# Patient Record
Sex: MALE | Race: OTHER | NOT HISPANIC OR LATINO | URBAN - METROPOLITAN AREA
[De-identification: names, ages, dates, MRNs, and addresses within clinical notes are randomized per-mention and may not be internally consistent; named-entity substitution may affect disease eponyms.]

---

## 2019-11-25 ENCOUNTER — INPATIENT (INPATIENT)
Facility: HOSPITAL | Age: 19
LOS: 1 days | Discharge: HOME | DRG: 558 | End: 2019-11-27
Attending: INTERNAL MEDICINE | Admitting: HOSPITALIST
Payer: COMMERCIAL

## 2019-11-25 VITALS
RESPIRATION RATE: 16 BRPM | DIASTOLIC BLOOD PRESSURE: 70 MMHG | HEART RATE: 55 BPM | SYSTOLIC BLOOD PRESSURE: 138 MMHG | WEIGHT: 154.98 LBS | TEMPERATURE: 98 F | OXYGEN SATURATION: 100 %

## 2019-11-25 LAB
ALBUMIN SERPL ELPH-MCNC: 1.5 G/DL — LOW (ref 3.3–5)
ALP SERPL-CCNC: 26 U/L — LOW (ref 40–120)
ALT FLD-CCNC: 75 U/L — HIGH (ref 10–45)
ANION GAP SERPL CALC-SCNC: 1 MMOL/L — LOW (ref 5–17)
ANION GAP SERPL CALC-SCNC: 8 MMOL/L — SIGNIFICANT CHANGE UP (ref 5–17)
APPEARANCE UR: CLEAR — SIGNIFICANT CHANGE UP
AST SERPL-CCNC: 279 U/L — HIGH (ref 10–40)
BACTERIA # UR AUTO: NEGATIVE — SIGNIFICANT CHANGE UP
BASOPHILS # BLD AUTO: 0.01 K/UL — SIGNIFICANT CHANGE UP (ref 0–0.2)
BASOPHILS NFR BLD AUTO: 0.2 % — SIGNIFICANT CHANGE UP (ref 0–2)
BILIRUB SERPL-MCNC: 0.1 MG/DL — LOW (ref 0.2–1.2)
BILIRUB UR-MCNC: NEGATIVE — SIGNIFICANT CHANGE UP
BUN SERPL-MCNC: 11 MG/DL — SIGNIFICANT CHANGE UP (ref 7–23)
BUN SERPL-MCNC: 6 MG/DL — LOW (ref 7–23)
CALCIUM SERPL-MCNC: 4.1 MG/DL — CRITICAL LOW (ref 8.4–10.5)
CALCIUM SERPL-MCNC: 9.1 MG/DL — SIGNIFICANT CHANGE UP (ref 8.4–10.5)
CHLORIDE SERPL-SCNC: 109 MMOL/L — HIGH (ref 96–108)
CHLORIDE SERPL-SCNC: 131 MMOL/L — HIGH (ref 96–108)
CK SERPL-CCNC: CRITICAL HIGH U/L (ref 30–200)
CK SERPL-CCNC: CRITICAL HIGH U/L (ref 30–200)
CO2 SERPL-SCNC: 15 MMOL/L — LOW (ref 22–31)
CO2 SERPL-SCNC: 26 MMOL/L — SIGNIFICANT CHANGE UP (ref 22–31)
COLOR SPEC: COLORLESS — SIGNIFICANT CHANGE UP
CREAT SERPL-MCNC: 0.45 MG/DL — LOW (ref 0.5–1.3)
CREAT SERPL-MCNC: 0.86 MG/DL — SIGNIFICANT CHANGE UP (ref 0.5–1.3)
DIFF PNL FLD: ABNORMAL
EOSINOPHIL # BLD AUTO: 0.16 K/UL — SIGNIFICANT CHANGE UP (ref 0–0.5)
EOSINOPHIL NFR BLD AUTO: 3.3 % — SIGNIFICANT CHANGE UP (ref 0–6)
EPI CELLS # UR: 0 /HPF — SIGNIFICANT CHANGE UP
GLUCOSE SERPL-MCNC: 44 MG/DL — CRITICAL LOW (ref 70–99)
GLUCOSE SERPL-MCNC: 93 MG/DL — SIGNIFICANT CHANGE UP (ref 70–99)
GLUCOSE UR QL: NEGATIVE — SIGNIFICANT CHANGE UP
HCT VFR BLD CALC: 28 % — LOW (ref 39–50)
HGB BLD-MCNC: 9.1 G/DL — LOW (ref 13–17)
HYALINE CASTS # UR AUTO: 0 /LPF — SIGNIFICANT CHANGE UP (ref 0–2)
IMM GRANULOCYTES NFR BLD AUTO: 0.4 % — SIGNIFICANT CHANGE UP (ref 0–1.5)
KETONES UR-MCNC: NEGATIVE — SIGNIFICANT CHANGE UP
LEUKOCYTE ESTERASE UR-ACNC: NEGATIVE — SIGNIFICANT CHANGE UP
LYMPHOCYTES # BLD AUTO: 1.78 K/UL — SIGNIFICANT CHANGE UP (ref 1–3.3)
LYMPHOCYTES # BLD AUTO: 36.5 % — SIGNIFICANT CHANGE UP (ref 13–44)
MCHC RBC-ENTMCNC: 27.8 PG — SIGNIFICANT CHANGE UP (ref 27–34)
MCHC RBC-ENTMCNC: 32.5 GM/DL — SIGNIFICANT CHANGE UP (ref 32–36)
MCV RBC AUTO: 85.6 FL — SIGNIFICANT CHANGE UP (ref 80–100)
MONOCYTES # BLD AUTO: 0.38 K/UL — SIGNIFICANT CHANGE UP (ref 0–0.9)
MONOCYTES NFR BLD AUTO: 7.8 % — SIGNIFICANT CHANGE UP (ref 2–14)
NEUTROPHILS # BLD AUTO: 2.53 K/UL — SIGNIFICANT CHANGE UP (ref 1.8–7.4)
NEUTROPHILS NFR BLD AUTO: 51.8 % — SIGNIFICANT CHANGE UP (ref 43–77)
NITRITE UR-MCNC: NEGATIVE — SIGNIFICANT CHANGE UP
NRBC # BLD: 0 /100 WBCS — SIGNIFICANT CHANGE UP (ref 0–0)
PH UR: 7 — SIGNIFICANT CHANGE UP (ref 5–8)
PLATELET # BLD AUTO: 198 K/UL — SIGNIFICANT CHANGE UP (ref 150–400)
POTASSIUM SERPL-MCNC: 2.1 MMOL/L — CRITICAL LOW (ref 3.5–5.3)
POTASSIUM SERPL-MCNC: 4.2 MMOL/L — SIGNIFICANT CHANGE UP (ref 3.5–5.3)
POTASSIUM SERPL-SCNC: 2.1 MMOL/L — CRITICAL LOW (ref 3.5–5.3)
POTASSIUM SERPL-SCNC: 4.2 MMOL/L — SIGNIFICANT CHANGE UP (ref 3.5–5.3)
PROT SERPL-MCNC: 3 G/DL — LOW (ref 6–8.3)
PROT UR-MCNC: NEGATIVE — SIGNIFICANT CHANGE UP
RBC # BLD: 3.27 M/UL — LOW (ref 4.2–5.8)
RBC # FLD: 12.5 % — SIGNIFICANT CHANGE UP (ref 10.3–14.5)
RBC CASTS # UR COMP ASSIST: 0 /HPF — SIGNIFICANT CHANGE UP (ref 0–4)
SODIUM SERPL-SCNC: 143 MMOL/L — SIGNIFICANT CHANGE UP (ref 135–145)
SODIUM SERPL-SCNC: 147 MMOL/L — HIGH (ref 135–145)
SP GR SPEC: 1.01 — SIGNIFICANT CHANGE UP (ref 1.01–1.02)
UROBILINOGEN FLD QL: NEGATIVE — SIGNIFICANT CHANGE UP
WBC # BLD: 4.88 K/UL — SIGNIFICANT CHANGE UP (ref 3.8–10.5)
WBC # FLD AUTO: 4.88 K/UL — SIGNIFICANT CHANGE UP (ref 3.8–10.5)
WBC UR QL: 0 /HPF — SIGNIFICANT CHANGE UP (ref 0–5)

## 2019-11-25 PROCEDURE — 99218: CPT

## 2019-11-25 RX ORDER — ACETAMINOPHEN 500 MG
975 TABLET ORAL ONCE
Refills: 0 | Status: COMPLETED | OUTPATIENT
Start: 2019-11-25 | End: 2019-11-25

## 2019-11-25 RX ORDER — SODIUM CHLORIDE 9 MG/ML
1000 INJECTION INTRAMUSCULAR; INTRAVENOUS; SUBCUTANEOUS
Refills: 0 | Status: DISCONTINUED | OUTPATIENT
Start: 2019-11-25 | End: 2019-11-26

## 2019-11-25 RX ORDER — SODIUM CHLORIDE 9 MG/ML
1000 INJECTION INTRAMUSCULAR; INTRAVENOUS; SUBCUTANEOUS ONCE
Refills: 0 | Status: COMPLETED | OUTPATIENT
Start: 2019-11-25 | End: 2019-11-25

## 2019-11-25 RX ADMIN — Medication 975 MILLIGRAM(S): at 18:00

## 2019-11-25 RX ADMIN — SODIUM CHLORIDE 1000 MILLILITER(S): 9 INJECTION INTRAMUSCULAR; INTRAVENOUS; SUBCUTANEOUS at 20:35

## 2019-11-25 RX ADMIN — SODIUM CHLORIDE 1000 MILLILITER(S): 9 INJECTION INTRAMUSCULAR; INTRAVENOUS; SUBCUTANEOUS at 17:28

## 2019-11-25 NOTE — ED ADULT NURSE NOTE - NS ED NURSE LEVEL OF CONSCIOUSNESS MENTAL STATUS
Awake/Alert/Cooperative [Dyspnea on exertion] : dyspnea during exertion [Chest  Pressure] : chest pressure [Shortness Of Breath] : shortness of breath [Chest Pain] : chest pain [Lower Ext Edema] : no extremity edema [Leg Claudication] : no intermittent leg claudication [Palpitations] : palpitations [Negative] : Endocrine

## 2019-11-25 NOTE — ED PROVIDER NOTE - MUSCULOSKELETAL MINIMAL EXAM
TTP to B/L biceps and pain with extension of arms. All compartments of arm soft, positive radial pulses, and B/L UE strength is 5/5. No calf tenderness.

## 2019-11-25 NOTE — ED CLERICAL - NS ED CLERK NOTE PRE-ARRIVAL INFORMATION; ADDITIONAL PRE-ARRIVAL INFORMATION
CC/Reason For referral: Bicep Pain, Blood in Urine  Preferred Consultant(if applicable): N/A  Who admits for you (if needed): N/A  Do you have documents you would like to fax over? No  Would you still like to speak to an ED attending? No

## 2019-11-25 NOTE — ED CDU PROVIDER INITIAL DAY NOTE - MEDICAL DECISION MAKING DETAILS
18 y/o M, otherwise healthy, presents via EMS for concern of rhabdomyolysis. On exam, he's well appearing, TTP to B/L biceps, and trouble extending B/L arms. Found to have CK >43,000 with normal renal function.  Pt very well appearing, but will observe in CDU for IV fluids, repeat labs.

## 2019-11-25 NOTE — ED CDU PROVIDER INITIAL DAY NOTE - OBJECTIVE STATEMENT
18 y/o M with no significant pmHx and no significant psHx presents to the ED via EMS c/o B/L bicep pain x4 days s/p extensive workout (bicep curls). Worsens with extension of arm. +Minimal neck pain while sitting and standing up. Denies CP, abd pain, back pain, and LE pain. Took Tylenol PTA administered by EMS. Has been taking Advil to alleviate pain. NKDA.  PT was in school medical office where he was given 3L: of IV fluids prior to EMS transport to ER. 20 y/o M with no significant pmHx and no significant psHx presents to the ED via EMS c/o B/L bicep pain x4 days s/p extensive workout (bicep curls). Worsens with extension of arm. +Minimal neck pain while sitting and standing up. Denies CP, abd pain, back pain, and LE pain. Took Tylenol PTA administered by EMS. Has been taking Advil to alleviate pain. NKDA.  PT was in [facility] medical office where he was given 3L: of IV fluids prior to EMS transport to ER.  In ED, patient had laboratory significant for H/H 9.1/28.0, AST//174 [place] [address]. Pt sent to CDU for frequent reeval, vitals ivf, pain control and repeat labs.

## 2019-11-25 NOTE — ED CDU PROVIDER INITIAL DAY NOTE - ATTENDING CONTRIBUTION TO CARE
20 y/o M, otherwise healthy, presents via EMS for concern for rhabdomyolysis after excessibe bicep curls 3 days ago. On exam, he's well appearing, heart rrr, lungs cta, abd soft ntnd, no ttp of lower extremities or back, +TTP to B/L biceps, and trouble extending B/L arms. Found to have CK >43,000 with normal renal function.  Pt very well appearing, but will observe in CDU for IV fluids, repeat labs.

## 2019-11-25 NOTE — ED ADULT NURSE NOTE - OBJECTIVE STATEMENT
19yr old w/ no pmhx presents to ED c/o B/L upper extremity pain. Pt states he was lifting weights on Thursday, when he awoke Fri. he had soreness in both his R and L biceps, and by Saturday and Sunday his pain increased to 9/10 and he couldn't fully extend his arms. P 19yr old w/ no pmhx bibems c/o B/L upper extremity pain x 4 days. Pt states he was lifting weights on Thursday, when he awoke Fri. he had soreness in both his R and L biceps, and by Saturday and Sunday his pain increased to 9/10 and he couldn't fully extend his arms. Pt describes the pain as an intense soreness, rates the pain 9/10, and states when he took 650mg of tylenol the pain decreased to 6/10. Pt denies any SOB, CP, n/v/d, numbness or tingling in extremities, Ha, or blurry vision. No edema or redness of extremities was noted upon assessment, pt c/o increased pain  during B/L arm extension. Pt assessed by MD, bed is lowered and locked, call bell is within reach. will reassess.

## 2019-11-25 NOTE — ED PROVIDER NOTE - CLINICAL SUMMARY MEDICAL DECISION MAKING FREE TEXT BOX
Taylor Pal (DO): 18 y/o M, otherwise healthy, presents via EMS for concern of rhabdomyolysis. On exam, he's well appearing, TTP to B/L biceps, and trouble extending B/L arms. Will check labs, CK, and urine. Will give fluids and reassess.

## 2019-11-25 NOTE — ED PROVIDER NOTE - OBJECTIVE STATEMENT
18 y/o M with no significant pmHx and no significant psHx presents to the ED via EMS c/o B/L bicep pain x4 days s/p extensive workout (bicep curls). Worsens with extension of arm. +Minimal neck pain while sitting and standing up. Denies CP, abd pain, back pain, and LE pain. Took Tylenol PTA administered by EMS. Has been taking Advil to alleviate pain. NKDA. 18 y/o M with no significant pmHx and no significant psHx presents to the ED via EMS c/o B/L bicep pain x4 days s/p extensive workout (bicep curls). Worsens with extension of arm. +Minimal neck pain while sitting and standing up. Denies CP, abd pain, back pain, and LE pain. Took Tylenol PTA administered by EMS. Has been taking Advil to alleviate pain. NKDA.  PT was in school medical office where he was given 3L: of IV fluids prior to EMS transport to ER.

## 2019-11-25 NOTE — ED PROVIDER NOTE - PROGRESS NOTE DETAILS
73 Pt's first CMP grossly abnormal - likely contaminated with saline.  Will repeat.

## 2019-11-26 DIAGNOSIS — R74.8 ABNORMAL LEVELS OF OTHER SERUM ENZYMES: ICD-10-CM

## 2019-11-26 DIAGNOSIS — Z02.9 ENCOUNTER FOR ADMINISTRATIVE EXAMINATIONS, UNSPECIFIED: ICD-10-CM

## 2019-11-26 DIAGNOSIS — T79.6XXA TRAUMATIC ISCHEMIA OF MUSCLE, INITIAL ENCOUNTER: ICD-10-CM

## 2019-11-26 DIAGNOSIS — M62.82 RHABDOMYOLYSIS: ICD-10-CM

## 2019-11-26 DIAGNOSIS — Z29.9 ENCOUNTER FOR PROPHYLACTIC MEASURES, UNSPECIFIED: ICD-10-CM

## 2019-11-26 LAB
ALBUMIN SERPL ELPH-MCNC: 4.2 G/DL — SIGNIFICANT CHANGE UP (ref 3.3–5)
ALBUMIN SERPL ELPH-MCNC: 4.3 G/DL — SIGNIFICANT CHANGE UP (ref 3.3–5)
ALP SERPL-CCNC: 68 U/L — SIGNIFICANT CHANGE UP (ref 40–120)
ALP SERPL-CCNC: 71 U/L — SIGNIFICANT CHANGE UP (ref 40–120)
ALT FLD-CCNC: 207 U/L — HIGH (ref 10–45)
ALT FLD-CCNC: 225 U/L — HIGH (ref 10–45)
ANION GAP SERPL CALC-SCNC: 12 MMOL/L — SIGNIFICANT CHANGE UP (ref 5–17)
ANION GAP SERPL CALC-SCNC: 13 MMOL/L — SIGNIFICANT CHANGE UP (ref 5–17)
AST SERPL-CCNC: 671 U/L — HIGH (ref 10–40)
AST SERPL-CCNC: 684 U/L — HIGH (ref 10–40)
BASOPHILS # BLD AUTO: 0.03 K/UL — SIGNIFICANT CHANGE UP (ref 0–0.2)
BASOPHILS NFR BLD AUTO: 0.3 % — SIGNIFICANT CHANGE UP (ref 0–2)
BILIRUB SERPL-MCNC: 0.2 MG/DL — SIGNIFICANT CHANGE UP (ref 0.2–1.2)
BILIRUB SERPL-MCNC: 0.2 MG/DL — SIGNIFICANT CHANGE UP (ref 0.2–1.2)
BUN SERPL-MCNC: 12 MG/DL — SIGNIFICANT CHANGE UP (ref 7–23)
BUN SERPL-MCNC: 9 MG/DL — SIGNIFICANT CHANGE UP (ref 7–23)
CALCIUM SERPL-MCNC: 9.6 MG/DL — SIGNIFICANT CHANGE UP (ref 8.4–10.5)
CALCIUM SERPL-MCNC: 9.6 MG/DL — SIGNIFICANT CHANGE UP (ref 8.4–10.5)
CHLORIDE SERPL-SCNC: 103 MMOL/L — SIGNIFICANT CHANGE UP (ref 96–108)
CHLORIDE SERPL-SCNC: 105 MMOL/L — SIGNIFICANT CHANGE UP (ref 96–108)
CK SERPL-CCNC: CRITICAL HIGH U/L (ref 30–200)
CK SERPL-CCNC: CRITICAL HIGH U/L (ref 30–200)
CO2 SERPL-SCNC: 24 MMOL/L — SIGNIFICANT CHANGE UP (ref 22–31)
CO2 SERPL-SCNC: 25 MMOL/L — SIGNIFICANT CHANGE UP (ref 22–31)
CREAT SERPL-MCNC: 0.79 MG/DL — SIGNIFICANT CHANGE UP (ref 0.5–1.3)
CREAT SERPL-MCNC: 0.96 MG/DL — SIGNIFICANT CHANGE UP (ref 0.5–1.3)
EOSINOPHIL # BLD AUTO: 0.31 K/UL — SIGNIFICANT CHANGE UP (ref 0–0.5)
EOSINOPHIL NFR BLD AUTO: 3.2 % — SIGNIFICANT CHANGE UP (ref 0–6)
GLUCOSE SERPL-MCNC: 101 MG/DL — HIGH (ref 70–99)
GLUCOSE SERPL-MCNC: 93 MG/DL — SIGNIFICANT CHANGE UP (ref 70–99)
HCT VFR BLD CALC: 45.7 % — SIGNIFICANT CHANGE UP (ref 39–50)
HGB BLD-MCNC: 15.2 G/DL — SIGNIFICANT CHANGE UP (ref 13–17)
IMM GRANULOCYTES NFR BLD AUTO: 0.3 % — SIGNIFICANT CHANGE UP (ref 0–1.5)
LYMPHOCYTES # BLD AUTO: 3.77 K/UL — HIGH (ref 1–3.3)
LYMPHOCYTES # BLD AUTO: 39.4 % — SIGNIFICANT CHANGE UP (ref 13–44)
MCHC RBC-ENTMCNC: 27.3 PG — SIGNIFICANT CHANGE UP (ref 27–34)
MCHC RBC-ENTMCNC: 33.3 GM/DL — SIGNIFICANT CHANGE UP (ref 32–36)
MCV RBC AUTO: 82.2 FL — SIGNIFICANT CHANGE UP (ref 80–100)
MONOCYTES # BLD AUTO: 0.54 K/UL — SIGNIFICANT CHANGE UP (ref 0–0.9)
MONOCYTES NFR BLD AUTO: 5.6 % — SIGNIFICANT CHANGE UP (ref 2–14)
NEUTROPHILS # BLD AUTO: 4.88 K/UL — SIGNIFICANT CHANGE UP (ref 1.8–7.4)
NEUTROPHILS NFR BLD AUTO: 51.2 % — SIGNIFICANT CHANGE UP (ref 43–77)
NRBC # BLD: 0 /100 WBCS — SIGNIFICANT CHANGE UP (ref 0–0)
PLATELET # BLD AUTO: 321 K/UL — SIGNIFICANT CHANGE UP (ref 150–400)
POTASSIUM SERPL-MCNC: 4 MMOL/L — SIGNIFICANT CHANGE UP (ref 3.5–5.3)
POTASSIUM SERPL-MCNC: 4.2 MMOL/L — SIGNIFICANT CHANGE UP (ref 3.5–5.3)
POTASSIUM SERPL-SCNC: 4 MMOL/L — SIGNIFICANT CHANGE UP (ref 3.5–5.3)
POTASSIUM SERPL-SCNC: 4.2 MMOL/L — SIGNIFICANT CHANGE UP (ref 3.5–5.3)
PROT SERPL-MCNC: 7.2 G/DL — SIGNIFICANT CHANGE UP (ref 6–8.3)
PROT SERPL-MCNC: 7.4 G/DL — SIGNIFICANT CHANGE UP (ref 6–8.3)
RBC # BLD: 5.56 M/UL — SIGNIFICANT CHANGE UP (ref 4.2–5.8)
RBC # FLD: 12.5 % — SIGNIFICANT CHANGE UP (ref 10.3–14.5)
SODIUM SERPL-SCNC: 139 MMOL/L — SIGNIFICANT CHANGE UP (ref 135–145)
SODIUM SERPL-SCNC: 143 MMOL/L — SIGNIFICANT CHANGE UP (ref 135–145)
WBC # BLD: 9.56 K/UL — SIGNIFICANT CHANGE UP (ref 3.8–10.5)
WBC # FLD AUTO: 9.56 K/UL — SIGNIFICANT CHANGE UP (ref 3.8–10.5)

## 2019-11-26 PROCEDURE — 99223 1ST HOSP IP/OBS HIGH 75: CPT

## 2019-11-26 PROCEDURE — 99217: CPT

## 2019-11-26 RX ORDER — CYCLOBENZAPRINE HYDROCHLORIDE 10 MG/1
10 TABLET, FILM COATED ORAL ONCE
Refills: 0 | Status: COMPLETED | OUTPATIENT
Start: 2019-11-26 | End: 2019-11-26

## 2019-11-26 RX ORDER — SODIUM CHLORIDE 9 MG/ML
1000 INJECTION INTRAMUSCULAR; INTRAVENOUS; SUBCUTANEOUS ONCE
Refills: 0 | Status: COMPLETED | OUTPATIENT
Start: 2019-11-26 | End: 2019-11-26

## 2019-11-26 RX ORDER — CYCLOBENZAPRINE HYDROCHLORIDE 10 MG/1
5 TABLET, FILM COATED ORAL THREE TIMES A DAY
Refills: 0 | Status: DISCONTINUED | OUTPATIENT
Start: 2019-11-26 | End: 2019-11-27

## 2019-11-26 RX ORDER — SODIUM CHLORIDE 9 MG/ML
1000 INJECTION INTRAMUSCULAR; INTRAVENOUS; SUBCUTANEOUS
Refills: 0 | Status: DISCONTINUED | OUTPATIENT
Start: 2019-11-26 | End: 2019-11-27

## 2019-11-26 RX ORDER — ACETAMINOPHEN 500 MG
975 TABLET ORAL EVERY 6 HOURS
Refills: 0 | Status: DISCONTINUED | OUTPATIENT
Start: 2019-11-26 | End: 2019-11-27

## 2019-11-26 RX ORDER — SODIUM CHLORIDE 9 MG/ML
1000 INJECTION INTRAMUSCULAR; INTRAVENOUS; SUBCUTANEOUS
Refills: 0 | Status: DISCONTINUED | OUTPATIENT
Start: 2019-11-26 | End: 2019-11-26

## 2019-11-26 RX ADMIN — CYCLOBENZAPRINE HYDROCHLORIDE 10 MILLIGRAM(S): 10 TABLET, FILM COATED ORAL at 17:19

## 2019-11-26 RX ADMIN — SODIUM CHLORIDE 1000 MILLILITER(S): 9 INJECTION INTRAMUSCULAR; INTRAVENOUS; SUBCUTANEOUS at 09:00

## 2019-11-26 RX ADMIN — SODIUM CHLORIDE 300 MILLILITER(S): 9 INJECTION INTRAMUSCULAR; INTRAVENOUS; SUBCUTANEOUS at 10:12

## 2019-11-26 RX ADMIN — SODIUM CHLORIDE 200 MILLILITER(S): 9 INJECTION INTRAMUSCULAR; INTRAVENOUS; SUBCUTANEOUS at 00:00

## 2019-11-26 RX ADMIN — SODIUM CHLORIDE 300 MILLILITER(S): 9 INJECTION INTRAMUSCULAR; INTRAVENOUS; SUBCUTANEOUS at 20:38

## 2019-11-26 RX ADMIN — SODIUM CHLORIDE 200 MILLILITER(S): 9 INJECTION INTRAMUSCULAR; INTRAVENOUS; SUBCUTANEOUS at 04:00

## 2019-11-26 RX ADMIN — SODIUM CHLORIDE 300 MILLILITER(S): 9 INJECTION INTRAMUSCULAR; INTRAVENOUS; SUBCUTANEOUS at 14:04

## 2019-11-26 RX ADMIN — SODIUM CHLORIDE 200 MILLILITER(S): 9 INJECTION INTRAMUSCULAR; INTRAVENOUS; SUBCUTANEOUS at 09:20

## 2019-11-26 RX ADMIN — SODIUM CHLORIDE 300 MILLILITER(S): 9 INJECTION INTRAMUSCULAR; INTRAVENOUS; SUBCUTANEOUS at 17:24

## 2019-11-26 NOTE — H&P ADULT - NSHPREVIEWOFSYSTEMS_GEN_ALL_CORE
Review of Systems:   CONSTITUTIONAL: No fever, weight loss, or fatigue  EYES: No eye pain, visual disturbances, or discharge  ENMT:  No difficulty hearing, tinnitus, vertigo; No sinus or throat pain  NECK: No pain or stiffness  RESPIRATORY: No cough, wheezing, chills or hemoptysis; No shortness of breath  CARDIOVASCULAR: No chest pain, palpitations, dizziness, or leg swelling  GASTROINTESTINAL: No abdominal or epigastric pain. No nausea, vomiting, or hematemesis; No diarrhea or constipation. No melena or hematochezia.  GENITOURINARY: No dysuria, frequency, hematuria, or incontinence  NEUROLOGICAL: No headaches, memory loss, loss of strength, numbness, or tremors  SKIN: No itching, burning, rashes, or lesions   LYMPH NODES: No enlarged glands  ENDOCRINE: No heat or cold intolerance; No hair loss  MUSCULOSKELETAL: No joint pain or swelling; No muscle, back, or extremity pain  PSYCHIATRIC: No depression, anxiety, mood swings, or difficulty sleeping  HEME/LYMPH: No easy bruising, or bleeding gums  ALLERGY AND IMMUNOLOGIC: No hives or eczema

## 2019-11-26 NOTE — H&P ADULT - PROBLEM SELECTOR PLAN 4
Transitions of Care Status:  1.  Name of PCP: qamar RUIZ,  [employer]   2.  PCP Contacted on Admission: [ ] Y    [X] N    3.  PCP contacted at Discharge: [ ] Y    [ ] N    [ ] N/A  4.  Post-Discharge Appointment Date and Location:  5.  Summary of Handoff given to PCP:

## 2019-11-26 NOTE — H&P ADULT - NSHPPHYSICALEXAM_GEN_ALL_CORE
Vital Signs Last 24 Hrs  T(C): 36.7 (11-26-19 @ 16:03)  T(F): 98 (11-26-19 @ 16:03), Max: 98 (11-26-19 @ 16:03)  HR: 68 (11-26-19 @ 16:03) (49 - 68)  BP: 130/70 (11-26-19 @ 16:03)  BP(mean): --  RR: 18 (11-26-19 @ 16:03) (18 - 18)  SpO2: 98% (11-26-19 @ 16:03) (96% - 98%)  Wt(kg): --    PHYSICAL EXAM:  GENERAL: NAD, well-developed  HEAD:  Atraumatic, Normocephalic  EYES: EOMI, PERRLA, conjunctiva and sclera clear  NECK: Supple, No JVD  CHEST/LUNG: Clear to auscultation bilaterally; No wheeze  HEART: Regular rate and rhythm; No murmurs, rubs, or gallops  ABDOMEN: Soft, Nontender, Nondistended; Bowel sounds present  EXTREMITIES:  2+ Peripheral Pulses, No clubbing, cyanosis, or edema; minimal tenderness b/l biceps  PSYCH: AAOx3  NEUROLOGY: non-focal  SKIN: No rashes or lesions

## 2019-11-26 NOTE — H&P ADULT - PROBLEM SELECTOR PLAN 1
--[place] up to 555[address], has not shown improvement after 24 hours large volume IVF (but no worsening)  --renal function WNL  --trend CK BID, c/w normal saline  --pain improving. Tylenol PRN.

## 2019-11-26 NOTE — ED CDU PROVIDER SUBSEQUENT DAY NOTE - ATTENDING CONTRIBUTION TO CARE
ED attending Dr Olvin Quinn note:  Patient re-evaluated and doing well.  No acute issues at  this time.  Lab and radiology tests reviewed with patient and/or family.  Patient stable for discharge.  I have personally performed a face to face diagnostic evaluation on this patient.  I have reviewed the ACP note and agree with the history, exam, and plan of care, except as noted.  History and Exam by me showed improvement of sts, arms suppler, non tense, walking around with iv pole to continue to aggressively hydrate with cpk 50,000 range with nl kidney function, lft derangement from rhabdo to likely require follow up and recheck labs at [facility] on dc.

## 2019-11-26 NOTE — H&P ADULT - NSICDXPASTSURGICALHX_GEN_ALL_CORE_FT
PAST SURGICAL HISTORY:  No significant past surgical history PAST SURGICAL HISTORY:  S/P appendectomy

## 2019-11-26 NOTE — H&P ADULT - PROBLEM SELECTOR PLAN 2
--most likely secondary to rhabdomyolysis  --bili and alk phos WNL.  --continue to trend CMP; if not improving when CK improves, will need further investigation.

## 2019-11-26 NOTE — ED ADULT NURSE REASSESSMENT NOTE - NS ED NURSE REASSESS COMMENT FT1
Received report from Renate PHIPPS. Patient resting comfortably in stretcher. A&Ox4. Vital signs are stable. Reports bilateral arm pain, worse on the left arm. States his arms feel stiff and is unable to fully extend both arms. Patient in no acute distress. Patient pending blood test results. Plan of care discussed. Safety and comfort measures maintained. Will continue to monitor.
Report taken from Ivory PHIPPS. states pt have a good night with no complaints. Will continue to monitor.
@2000 Pt received from DESIRE Justin. Pt oriented to CDU & plan of care was discussed. Pt A&O x 3. Pt in CDU awaiting bed. Pt denies any pain as of now only mild discomfort when extending his arms. Pt denies any chest pain, SOB, dizziness or palpitations as of now. Pt getting IVF as per MAR. Safety & comfort measures maintained. Call bell in reach. Will continue to monitor.    @2220 Report given to DESIRE Will. AO4. VSS as per flowsheet. Pt denies pain. Family at bedside. Safety maintained.
Pt received from DESIRE Kirkpatrick. Pt oriented to CDU & plan of care was discussed. Pt A&O x 3. Pt in CDU for IVF, and repeat labs in am. Pt states he has pain when extending b/l arms. Pt states that left arm hurts the worse 7/10 with movement. Pt denies any chest pain, SOB, dizziness, chills, or palpitations. Pt on NS @ 200 ml/hr. Pt resting in bed with family at bedside. Safety & comfort measures maintained. Call bell in reach. Will continue to monitor.

## 2019-11-26 NOTE — ED CDU PROVIDER SUBSEQUENT DAY NOTE - PROGRESS NOTE DETAILS
CDU PROGRESS NOTE STEPHANIE LUQUE: Pt resting comfortably, without complaint. dr Lee from [facility] at bedside, agrees with plan for IVF hydration and repeating labs. Will follow results and continue to monitor. CDU NOTE STEPHANIE Rondon: pt resting comfortably, feels well without complaint. mild b/l arm soreness- improved. NAD VSS. b/l UE- moderate ttp. no obvious swelling, not tense. awaiting repeat CPK. CDU NOTE STEPHANIE Rondon: pt resting comfortably, feels well except for moderate b/l arm soreness but  improved. NAD VSS. b/l UE- moderate ttp. no obvious swelling, not tense. awaiting repeat CPK. CDU NOTE STEPHANIE Rondon: pt resting c/o neck pain and b/l arm pain requesting pain medication. NAD VSS. will avoid tylenol in setting of elevated LFTs. discussed NSAIDS. pt ok with ice packs for now.  cpk did not come up, will continue aggressive IVF and recheck. CDU NOTE STEPHANIE Rondon: pt resting c/o neck pain, b/l arm soreness. no other complaints. NAD VSS. flexeril ordered. will continued to monitor CPK levels. CDU NOTE STEPHANIE Rondon: cpk 43,000. spoke with Dr. Lee, and CDU attending Dr. Molina- will admit to medicine.

## 2019-11-26 NOTE — ED CDU PROVIDER SUBSEQUENT DAY NOTE - HISTORY ATTESTATION, MLM
Muscle weakness M68.2; deficits in balance, IADL tasks
I have reviewed and confirmed nurses' notes...

## 2019-11-26 NOTE — ED CDU PROVIDER DISPOSITION NOTE - ATTENDING CONTRIBUTION TO CARE
Attending Contribution to Care: ED attending Dr Olvin Quinn note:  Patient re-evaluated and doing well.  No acute issues at  this time.  Lab and radiology tests reviewed with patient and/or family.  Patient stable for discharge.  I have personally performed a face to face diagnostic evaluation on this patient.  I have reviewed the ACP note and agree with the history, exam, and plan of care, except as noted.  History and Exam by me showed improvement of sts, arms suppler, non tense, walking around with iv pole to continue to aggressively hydrate with cpk 50,000 range with nl kidney function, lft derangement from rhabdo to likely require follow up and recheck labs at [facility] on d/c. Attending Contribution to Care: ED attending Dr Olvin Quinn note:  Patient re-evaluated and doing well.  No acute issues at  this time.  Lab and radiology tests reviewed with patient and/or family.  Patient stable for discharge.  I have personally performed a face to face diagnostic evaluation on this patient.  I have reviewed the ACP note and agree with the history, exam, and plan of care, except as noted.  History and Exam by me showed improvement of sts, arms suppler, non tense, walking around with iv pole to continue to aggressively hydrate with cpk 50,000 range with nl kidney function, lft derangement from rhabdo to likely require follow up and recheck labs at [facility] on d/c.    Pt with Rhabdo not improving sufficient to be dc home. Will admit for continued treatment, hydration and monitoring.   Moreno Molina MD, [facility]

## 2019-11-26 NOTE — H&P ADULT - NSHPLABSRESULTS_GEN_ALL_CORE
EKG, labs, and imaging personally reviewed and interpreted.     LABS:                        15.2   9.56  )-----------( 321      ( 2019 06:37 )             45.7     143  |  105  |  9   ----------------------------<  93  4.0   |  25  |  0.79    Ca    9.6      2019 16:29    TPro  7.4  /  Alb  4.2  /  TBili  0.2  /  DBili  x   /  AST  671<H>  /  ALT  225<H>  /  AlkPhos  71  11-26      CARDIAC MARKERS ( 2019 16:29 )  x     / x     / 50372 U/L / x     / x      CARDIAC MARKERS ( 2019 06:37 )  x     / x     / 06483 U/L / x     / x      CARDIAC MARKERS ( 2019 19:36 )  x     / x     / 29246 U/L / x     / x      CARDIAC MARKERS ( 2019 18:26 )  x     / x     / 62368 U/L / x     / x        Urinalysis Basic - ( 2019 18:04 )    Color: Colorless / Appearance: Clear / S.014 / pH: x  Gluc: x / Ketone: Negative  / Bili: Negative / Urobili: Negative   Blood: x / Protein: Negative / Nitrite: Negative   Leuk Esterase: Negative / RBC: 0 /hpf / WBC 0 /HPF   Sq Epi: x / Non Sq Epi: 0 /hpf / Bacteria: Negative    RADIOLOGY & ADDITIONAL TESTS:  Imaging Personally Reviewed:    Consultant(s) Notes Reviewed:  Yes  Care Discussed with Consultants/Other Providers: Yes  Outpatient and prior hospitalization records reviewed: Yes

## 2019-11-26 NOTE — H&P ADULT - PROBLEM SELECTOR PROBLEM 3
2017    To: [facility]     Re: Anoop Angella        [address] [address]        Member ID: [id number]         To Whom It May Concern    This is to document that a referral for [id number] [place] [address].  Arben (: 10/06/1 Need for prophylactic measure

## 2019-11-26 NOTE — ED CDU PROVIDER DISPOSITION NOTE - CLINICAL COURSE
18 y/o M with no significant pmHx and no significant psHx presents to the ED via EMS c/o B/L bicep pain x4 days s/p extensive workout (bicep curls). Worsens with extension of arm. +Minimal neck pain while sitting and standing up. Denies CP, abd pain, back pain, and LE pain. Took Tylenol PTA administered by EMS. Has been taking Advil to alleviate pain. NKDA.  PT was in [facility] medical office where he was given 3L: of IV fluids prior to EMS transport to ER.  In ED, patient had laboratory significant for H/H 9.1/28.0, AST//174 [place] [address]. Pt sent to CDU for frequent reeval, vitals ivf, pain control and repeat labs. 20 y/o M with no significant pmHx and no significant psHx presents to the ED via EMS c/o B/L bicep pain x4 days s/p extensive workout (bicep curls). Worsens with extension of arm. +Minimal neck pain while sitting and standing up. Denies CP, abd pain, back pain, and LE pain. Took Tylenol PTA administered by EMS. Has been taking Advil to alleviate pain. NKDA.  PT was in [facility] medical office where he was given 3L: of IV fluids prior to EMS transport to ER.  In ED, patient had laboratory significant for H/H 9.1/28.0, AST//174 [place] [address]. Pt sent to CDU for frequent reeval, vitals ivf, pain control and repeat labs. cpk still very elevated despite aggressive fluid hydration, admitted to [facility]

## 2019-11-26 NOTE — ED CDU PROVIDER DISPOSITION NOTE - NSFOLLOWUPINSTRUCTIONS_ED_ALL_ED_FT
Rest, stay hydrated and avoid weight lifting or intense exercising for the next few days  Follow up with your Primary Care Physician within the next 2-3 days  Bring a copy of your test results with you to your appointment  Return to the Emergency Room if you experience new or worsening symptoms

## 2019-11-26 NOTE — ED CDU PROVIDER SUBSEQUENT DAY NOTE - HISTORY
CDU PROGRESS NOTE PA ENE: Pt resting comfortably, feeling well without complaint. NAD, Will continue to monitor.

## 2019-11-26 NOTE — H&P ADULT - HISTORY OF PRESENT ILLNESS
19M no significant PMH p/w b/l bicep pain x 4 days after extensive upper arm work out. 19M no significant PMH p/w b/l bicep pain x 4 days after extensive upper arm work out. Pain is aching, moderate, non-radiating, exacerbated with arm extension. Also has minimal posterior neck aching while sitting and standing. He takes an OTC protein supplement. Had been taking advil PRN for this with some improvement in pain. +dark urine. Denies lightheadedness, chest pain, SOB, NVD, abdominal pain, back pain, dysuria, leg pain. He received 3L IVF with EMS. Over the past 24 hours has received an additional 6L IVF in ED/CDU. Currently feels that the pain is improving. No orthopnea, respiratory distress, LE edema.

## 2019-11-27 VITALS
DIASTOLIC BLOOD PRESSURE: 77 MMHG | HEART RATE: 81 BPM | TEMPERATURE: 98 F | RESPIRATION RATE: 18 BRPM | SYSTOLIC BLOOD PRESSURE: 123 MMHG | OXYGEN SATURATION: 98 %

## 2019-11-27 DIAGNOSIS — Z90.49 ACQUIRED ABSENCE OF OTHER SPECIFIED PARTS OF DIGESTIVE TRACT: Chronic | ICD-10-CM

## 2019-11-27 LAB
ALBUMIN SERPL ELPH-MCNC: 3.8 G/DL — SIGNIFICANT CHANGE UP (ref 3.3–5)
ALP SERPL-CCNC: 61 U/L — SIGNIFICANT CHANGE UP (ref 40–120)
ALT FLD-CCNC: 211 U/L — HIGH (ref 10–45)
ANION GAP SERPL CALC-SCNC: 11 MMOL/L — SIGNIFICANT CHANGE UP (ref 5–17)
AST SERPL-CCNC: 582 U/L — HIGH (ref 10–40)
BASOPHILS # BLD AUTO: 0.03 K/UL — SIGNIFICANT CHANGE UP (ref 0–0.2)
BASOPHILS NFR BLD AUTO: 0.3 % — SIGNIFICANT CHANGE UP (ref 0–2)
BILIRUB SERPL-MCNC: 0.3 MG/DL — SIGNIFICANT CHANGE UP (ref 0.2–1.2)
BUN SERPL-MCNC: 6 MG/DL — LOW (ref 7–23)
CALCIUM SERPL-MCNC: 9.4 MG/DL — SIGNIFICANT CHANGE UP (ref 8.4–10.5)
CHLORIDE SERPL-SCNC: 104 MMOL/L — SIGNIFICANT CHANGE UP (ref 96–108)
CK SERPL-CCNC: CRITICAL HIGH U/L (ref 30–200)
CK SERPL-CCNC: CRITICAL HIGH U/L (ref 30–200)
CO2 SERPL-SCNC: 24 MMOL/L — SIGNIFICANT CHANGE UP (ref 22–31)
CREAT SERPL-MCNC: 0.78 MG/DL — SIGNIFICANT CHANGE UP (ref 0.5–1.3)
EOSINOPHIL # BLD AUTO: 0.36 K/UL — SIGNIFICANT CHANGE UP (ref 0–0.5)
EOSINOPHIL NFR BLD AUTO: 3.9 % — SIGNIFICANT CHANGE UP (ref 0–6)
GLUCOSE SERPL-MCNC: 98 MG/DL — SIGNIFICANT CHANGE UP (ref 70–99)
HCT VFR BLD CALC: 45.3 % — SIGNIFICANT CHANGE UP (ref 39–50)
HGB BLD-MCNC: 14.7 G/DL — SIGNIFICANT CHANGE UP (ref 13–17)
IMM GRANULOCYTES NFR BLD AUTO: 0.4 % — SIGNIFICANT CHANGE UP (ref 0–1.5)
LYMPHOCYTES # BLD AUTO: 3.27 K/UL — SIGNIFICANT CHANGE UP (ref 1–3.3)
LYMPHOCYTES # BLD AUTO: 35.1 % — SIGNIFICANT CHANGE UP (ref 13–44)
MCHC RBC-ENTMCNC: 27.6 PG — SIGNIFICANT CHANGE UP (ref 27–34)
MCHC RBC-ENTMCNC: 32.5 GM/DL — SIGNIFICANT CHANGE UP (ref 32–36)
MCV RBC AUTO: 85 FL — SIGNIFICANT CHANGE UP (ref 80–100)
MONOCYTES # BLD AUTO: 0.55 K/UL — SIGNIFICANT CHANGE UP (ref 0–0.9)
MONOCYTES NFR BLD AUTO: 5.9 % — SIGNIFICANT CHANGE UP (ref 2–14)
NEUTROPHILS # BLD AUTO: 5.07 K/UL — SIGNIFICANT CHANGE UP (ref 1.8–7.4)
NEUTROPHILS NFR BLD AUTO: 54.4 % — SIGNIFICANT CHANGE UP (ref 43–77)
PLATELET # BLD AUTO: 313 K/UL — SIGNIFICANT CHANGE UP (ref 150–400)
POTASSIUM SERPL-MCNC: 4.1 MMOL/L — SIGNIFICANT CHANGE UP (ref 3.5–5.3)
POTASSIUM SERPL-SCNC: 4.1 MMOL/L — SIGNIFICANT CHANGE UP (ref 3.5–5.3)
PROT SERPL-MCNC: 6.8 G/DL — SIGNIFICANT CHANGE UP (ref 6–8.3)
RBC # BLD: 5.33 M/UL — SIGNIFICANT CHANGE UP (ref 4.2–5.8)
RBC # FLD: 12.5 % — SIGNIFICANT CHANGE UP (ref 10.3–14.5)
SODIUM SERPL-SCNC: 139 MMOL/L — SIGNIFICANT CHANGE UP (ref 135–145)
WBC # BLD: 9.32 K/UL — SIGNIFICANT CHANGE UP (ref 3.8–10.5)
WBC # FLD AUTO: 9.32 K/UL — SIGNIFICANT CHANGE UP (ref 3.8–10.5)

## 2019-11-27 PROCEDURE — 99239 HOSP IP/OBS DSCHRG MGMT >30: CPT

## 2019-11-27 PROCEDURE — 80076 HEPATIC FUNCTION PANEL: CPT

## 2019-11-27 PROCEDURE — 82962 GLUCOSE BLOOD TEST: CPT

## 2019-11-27 PROCEDURE — G0378: CPT

## 2019-11-27 PROCEDURE — 81001 URINALYSIS AUTO W/SCOPE: CPT

## 2019-11-27 PROCEDURE — 85027 COMPLETE CBC AUTOMATED: CPT

## 2019-11-27 PROCEDURE — 80048 BASIC METABOLIC PNL TOTAL CA: CPT

## 2019-11-27 PROCEDURE — 99285 EMERGENCY DEPT VISIT HI MDM: CPT | Mod: 25

## 2019-11-27 PROCEDURE — 80053 COMPREHEN METABOLIC PANEL: CPT

## 2019-11-27 PROCEDURE — 82550 ASSAY OF CK (CPK): CPT

## 2019-11-27 RX ORDER — IBUPROFEN 200 MG
2 TABLET ORAL
Qty: 0 | Refills: 0 | DISCHARGE

## 2019-11-27 RX ADMIN — SODIUM CHLORIDE 250 MILLILITER(S): 9 INJECTION INTRAMUSCULAR; INTRAVENOUS; SUBCUTANEOUS at 05:36

## 2019-11-27 NOTE — DISCHARGE NOTE PROVIDER - HOSPITAL COURSE
19M no significant PMH p/w b/l bicep pain x 4 days after extensive upper arm work out. Pain is aching, moderate, non-radiating, exacerbated with arm extension. Also has minimal posterior neck aching while sitting and standing. He takes an OTC protein supplement. Had been taking advil PRN for this with some improvement in pain. +dark urine. Denies lightheadedness, chest pain, SOB, NVD, abdominal pain, back pain, dysuria, leg pain. He received 3L IVF with EMS. Over the past 24 hours has received an additional 6L IVF in ED/CDU. Currently feels that the pain is improving. No orthopnea, respiratory distress, LE edema. 19M no significant PMH p/w b/l bicep pain x 4 days after extensive upper arm work out. Pain is aching, moderate, non-radiating, exacerbated with arm extension. Also has minimal posterior neck aching while sitting and standing. He takes an OTC protein supplement. Had been taking advil PRN for this with some improvement in pain. +dark urine. Denies lightheadedness, chest pain, SOB, NVD, abdominal pain, back pain, dysuria, leg pain. He received 3L IVF with EMS. Over the past 24 hours has received an additional 6L IVF in ED/CDU. Currently feels that the pain is improving. CK improving. No orthopnea, respiratory distress, LE edema.

## 2019-11-27 NOTE — DISCHARGE NOTE NURSING/CASE MANAGEMENT/SOCIAL WORK - NSPROEXTENSIONSOFSELF_GEN_A_NUR
clothing, jacket, black shoes, cellphone/eyeglasses eyeglasses/clothing, jacket, black shoes, cellphone/none

## 2019-11-27 NOTE — PROGRESS NOTE ADULT - PROBLEM SELECTOR PLAN 1
--CK downtrending to 3000s this AM, will d/c fluids at this time and encourage po intake and repeat CK in afternoon if continues to downtrend will d/c home  --encourage po intake  --trend CK BID  --pain improving. Tylenol PRN.

## 2019-11-27 NOTE — PROGRESS NOTE ADULT - SUBJECTIVE AND OBJECTIVE BOX
[facility] Division of Hospital Medicine  Marino Castillo DO  Pager ([place], 4F-0U): [phone]  Other Times:  [phone]    Patient is a 19y old  Male who presents with a chief complaint of bicep pain b/l (2019 08:21)      SUBJECTIVE / OVERNIGHT EVENTS:    patient seen and examined. feels well. bicep soreness is improved.   no nausea vomiting or back pain.    ADDITIONAL REVIEW OF SYSTEMS:    MEDICATIONS  (STANDING):    MEDICATIONS  (PRN):  acetaminophen   Tablet .. 975 milliGRAM(s) Oral every 6 hours PRN Mild Pain (1 - 3), Moderate Pain (4 - 6)      CAPILLARY BLOOD GLUCOSE        I&O's Summary    2019 07:  -  2019 07:00  --------------------------------------------------------  IN: 2000 mL / OUT: 0 mL / NET: 2000 mL    2019 07:01  -  2019 11:36  --------------------------------------------------------  IN: 240 mL / OUT: 0 mL / NET: 240 mL        PHYSICAL EXAM:  Vital Signs Last 24 Hrs  T(C): 36.8 (2019 04:47), Max: 36.9 (2019 22:09)  T(F): 98.2 (2019 04:47), Max: 98.4 (2019 22:09)  HR: 60 (2019 04:47) (58 - 68)  BP: 118/75 (2019 04:47) (118/75 - 134/82)  BP(mean): --  RR: 18 (2019 04:47) (18 - 18)  SpO2: 98% (2019 04:47) (97% - 98%)    CONSTITUTIONAL: NAD,  RESPIRATORY: Normal respiratory effort; lungs are clear to auscultation bilaterally no wheezes rales or rhonchi  CARDIOVASCULAR: Regular rate and rhythm, normal S1 and S2, no murmur/rub/gallop;  ABDOMEN: Nontender to palpation, normoactive bowel sounds, no rebound/guarding; No hepatosplenomegaly  MUSCULOSKELETAL:  TTP in bicep region  PSYCH: A+O to person, place, and time; affect appropriate        LABS:                        14.7   9.32  )-----------( 313      ( 2019 08:18 )             45.3         139  |  104  |  6<L>  ----------------------------<  98  4.1   |  24  |  0.78    Ca    9.4      2019 06:31    TPro  6.8  /  Alb  3.8  /  TBili  0.3  /  DBili  x   /  AST  582<H>  /  ALT  211<H>  /  AlkPhos  61        CARDIAC MARKERS ( 2019 06:31 )  x     / x     / 08593 U/L / x     / x      CARDIAC MARKERS ( 2019 16:29 )  x     / x     / 77029 U/L / x     / x      CARDIAC MARKERS ( 2019 06:37 )  x     / x     / 18324 U/L / x     / x      CARDIAC MARKERS ( 2019 19:36 )  x     / x     / 22704 U/L / x     / x      CARDIAC MARKERS ( 2019 18:26 )  x     / x     / 25345 U/L / x     / x          Urinalysis Basic - ( 2019 18:04 )    Color: Colorless / Appearance: Clear / S.014 / pH: x  Gluc: x / Ketone: Negative  / Bili: Negative / Urobili: Negative   Blood: x / Protein: Negative / Nitrite: Negative   Leuk Esterase: Negative / RBC: 0 /hpf / WBC 0 /HPF   Sq Epi: x / Non Sq Epi: 0 /hpf / Bacteria: Negative          RADIOLOGY & ADDITIONAL TESTS:  Results Reviewed:   Imaging Personally Reviewed:  Electrocardiogram Personally Reviewed:    COORDINATION OF CARE:  Care Discussed with Consultants/Other Providers [Y/N]:  NP Ivory

## 2019-11-27 NOTE — DISCHARGE NOTE NURSING/CASE MANAGEMENT/SOCIAL WORK - NSDCPEWEB_GEN_ALL_CORE
[facility] for Tobacco Control website --- [email or web address] website --- [email or web address]

## 2019-11-27 NOTE — PROGRESS NOTE ADULT - PROBLEM SELECTOR PLAN 4
Transitions of Care Status:  1.  Name of PCP: qamar RUIZ,  [employer]   2.  PCP Contacted on Admission: [ ] Y    [X] N    3.  PCP contacted at Discharge: [ ] Y    [ ] N    [ ] N/A  4.  Post-Discharge Appointment Date and Location:  5.  Summary of Handoff given to PCP:

## 2019-11-27 NOTE — DISCHARGE NOTE PROVIDER - NSDCCPCAREPLAN_GEN_ALL_CORE_FT
PRINCIPAL DISCHARGE DIAGNOSIS  Diagnosis: Traumatic rhabdomyolysis, initial encounter  Assessment and Plan of Treatment: Get plenty of rest.  Drink enough water and fluids to keep your urine clear or pale yellow.  Eat a well-balanced diet.  Call your caregiver for follow-up as recommended.

## 2019-11-27 NOTE — DISCHARGE NOTE NURSING/CASE MANAGEMENT/SOCIAL WORK - PATIENT PORTAL LINK FT
You can access the FollowMyHealth Patient Portal offered by [facility] by registering at the following website: [email or web address]. By joining [facility] FollowMyHealth portal, you will also be able to view your health information using other applications (apps) compatible with our system.

## 2021-11-30 NOTE — PATIENT PROFILE ADULT - DO YOU FEEL UNSAFE AT WORK?
Methotrexate Pregnancy And Lactation Text: This medication is Pregnancy Category X and is known to cause fetal harm. This medication is excreted in breast milk. not applicable

## 2024-07-01 NOTE — ED ADULT NURSE REASSESSMENT NOTE - NURSING MUSC ROM
Dr Ibanez was wondering if he was taking Omasartin. She wrote a rx for it, the difference says Medoxomil and on the rx that is written today was benecir but both are 20 mg tabs, are they the same?  
Pain with extending arms
pain with extending b/l arms